# Patient Record
Sex: MALE | Race: WHITE | NOT HISPANIC OR LATINO | URBAN - METROPOLITAN AREA
[De-identification: names, ages, dates, MRNs, and addresses within clinical notes are randomized per-mention and may not be internally consistent; named-entity substitution may affect disease eponyms.]

---

## 2020-11-03 ENCOUNTER — PATIENT MESSAGE (OUTPATIENT)
Dept: PULMONOLOGY | Facility: CLINIC | Age: 45
End: 2020-11-03

## 2020-11-04 ENCOUNTER — TELEPHONE (OUTPATIENT)
Dept: PULMONOLOGY | Facility: CLINIC | Age: 45
End: 2020-11-04

## 2020-11-04 NOTE — TELEPHONE ENCOUNTER
----- Message from Rosa Marshall sent at 11/4/2020  9:38 AM CST -----  Contact: Jesus Lee is requesting a call back at [phone] in regards to a missed call he received. Jesus stated that he needs to schedule an appointment.     Thanks  DD

## 2020-11-17 ENCOUNTER — OFFICE VISIT (OUTPATIENT)
Dept: PULMONOLOGY | Facility: CLINIC | Age: 45
End: 2020-11-17

## 2020-11-17 VITALS
TEMPERATURE: 98 F | OXYGEN SATURATION: 96 % | BODY MASS INDEX: 28.47 KG/M2 | HEART RATE: 65 BPM | WEIGHT: 192.25 LBS | SYSTOLIC BLOOD PRESSURE: 124 MMHG | HEIGHT: 69 IN | DIASTOLIC BLOOD PRESSURE: 80 MMHG | RESPIRATION RATE: 17 BRPM

## 2020-11-17 DIAGNOSIS — R06.83 SNORING: ICD-10-CM

## 2020-11-17 DIAGNOSIS — R29.818 SUSPECTED SLEEP APNEA: Primary | ICD-10-CM

## 2020-11-17 DIAGNOSIS — G47.09 OTHER INSOMNIA: ICD-10-CM

## 2020-11-17 DIAGNOSIS — G47.19 EXCESSIVE DAYTIME SLEEPINESS: ICD-10-CM

## 2020-11-17 PROCEDURE — 99999 PR PBB SHADOW E&M-EST. PATIENT-LVL V: CPT | Mod: PBBFAC,,, | Performed by: INTERNAL MEDICINE

## 2020-11-17 PROCEDURE — 99215 OFFICE O/P EST HI 40 MIN: CPT | Mod: PBBFAC | Performed by: INTERNAL MEDICINE

## 2020-11-17 PROCEDURE — 99203 PR OFFICE/OUTPT VISIT, NEW, LEVL III, 30-44 MIN: ICD-10-PCS | Mod: S$PBB,,, | Performed by: INTERNAL MEDICINE

## 2020-11-17 PROCEDURE — 99999 PR PBB SHADOW E&M-EST. PATIENT-LVL V: ICD-10-PCS | Mod: PBBFAC,,, | Performed by: INTERNAL MEDICINE

## 2020-11-17 PROCEDURE — 99203 OFFICE O/P NEW LOW 30 MIN: CPT | Mod: S$PBB,,, | Performed by: INTERNAL MEDICINE

## 2020-11-17 NOTE — PATIENT INSTRUCTIONS
Improving sleep hygiene was discussed with the patient  Advise to Sleep as long as necessary to feel rested (usually seven to eight hours for adults) and then get out of bed  Maintain a regular sleep schedule, particularly a regular wake-up time in the morning  Try not to force sleep  Avoid caffeinated beverages after lunch, alcohol near bedtime , smoking or other nicotine intake, particularly during the evening  Adjust the bedroom environment as needed to decrease stimuli (reduce ambient light, turn off the television or radio)  Avoid prolonged use of light-emitting screens (laptops, tablets, smartphones, ebooks) before bedtime   Resolve concerns or worries before bedtime  Exercise regularly for at least 20 minutes, preferably more than four to five hours prior to bedtime   Avoid daytime naps, especially if they are longer than 20 to 30 minutes or occur late in the day  No eating / drinking for 3 hours before going to bed.  Elevate head of bed 30 - 45 %     CPAP HABITUATION PROCEDURE     Manuel Martinez, Ph.D., [facility] and Maxim Andrade M.D.  [facility], [facility]     Some people have difficulty adjusting to CPAP/BiPAP/AutoCPAP.  This is not unusual or hard to understand: Breathing with CPAP is different from ordinary breathing, and this difference is aversive to some. The problem can be overcome, however, and the benefits CPAP confers are certainly worth the effort.  Below, you will find a simple and gradual way to get used to CPAP before you try to use it all night, every night.  The essence of this procedure is to relax and let breathing with CPAP become a habit.  It may take about 2 weeks, and involves the followin. CPAP while awake and comfortably seated, during the late evening.     2. CPAP in bed while attempting sleep at night.     3. If your discomfort is too great at any time, discontinue and attempt again later the same night, for the same amount of time.    4. You and your physician may alter the times and pressures if necessary.     5. If you find that it is very easy to get used to CPAP, you may start using it every night when you are comfortable enough to do so.  6. IMPORTANT REMINDER: If you have a cold or sinus congestion it is okay to miss a night or two of CPAP. Consider using antihistamines or decongestants to clear up your sinus congestion prior to sleeping.     DAYS  1-3   Start CPAP while awake and comfortably seated during the late evening, after having prepared for bed.  You may do this while watching television, listening to music or reading. Use for 1 hour, then take off CPAP and go directly to bed to sleep     DAYS  4-6     Start CPAP when you go to bed and use for 1 hour, or until you fall asleep.  If your discomfort is too great at any time, discontinue and attempt again later the same night, for the same designated amount of time (1 hour).      DAYS  7-9     Increase time with CPAP to 2 hours a night.  If your discomfort is too great at any time, discontinue and attempt again later the same night, for the same designated amount of time (2 hours).      DAYS 10-12    Increase time with CPAP to 3 hours a night. If your discomfort is too great at any time, discontinue and attempt again later the same night, for the same designated amount of time (3 hours).      DAYS 13-15     Sleep the entire night with CPAP.      OPTIONAL: You may use Progressive Muscle Relaxation (PMR) to help put you at ease when using CPAP; do PMR twice each day, once in the morning or afternoon, and once in the evening just before using CPAP. You may do PMR prior to any attempt until you are comfortable with CPAP.        Continuous Positive Air Pressure (CPAP)  Continuous positive air pressure (CPAP) uses gentle air pressure to hold the airway open. CPAP is often the most effective treatment for sleep apnea and severe snoring. It works very well for many people. But keep in mind  that it can take several adjustments before the setup is right for you.       How CPAP Works  CPAP is a small portable pump beside the bed. The pump sends air through a hose, which is held over your nose and/or mouth by a mask. Mild air pressure is gently pushed through your airway. The air pressure nudges sagging tissues aside. This widens the airway so you can breathe better. CPAP may be combined with other kinds of therapy for sleep apnea.       Types of Air Pressure Treatments  There are different types of CPAP. Your doctor or CPAP technician will help you decide which type is best for you:  · Basic CPAP keeps the pressure constant all night long.  · A bilevel device (BiPAP) provides more pressure when you breathe in and less when you breathe out. A BiPAP machine also may be set to provide automatic breaths to maintain breathing if you stop breathing while sleeping.  · An autoCPAP device automatically adjusts pressure throughout the night and in response to changes such as body position, sleep stage, and snoring.  © 0700-4254 [employer]. [address] [place] [address]. All rights reserved. This information is not intended as a substitute for professional medical care. Always follow your healthcare professional's instructions.        Snoring and Sleep Apnea: Notes for a Partner  Snoring and sleep apnea affect your life, as well as your partners. You can help in the treatment of the problem. Be supportive. Encourage your partner both to get treatment and to make the adjustments needed to follow through.       Adjusting to Changes  Your partners treatment may involve making changes to certain life habits. You can help your partner make and stick with these changes. For example:  · Support and even join your partners exercise program.  · Be supportive if your partner gets CPAP (continuous positive airway pressure). He or she may feel self-conscious at first. Remind your partner to expect  adjustments to CPAP before it feels just right.  · Consider joining a snoring and sleep apnea support group.  Go Along to See the Health Care Provider  You can give the health care provider the best account of your partners nighttime breathing and snoring patterns. Try to go along to health care providers appointments. If you cant go, write notes for your partner to give to the health care provider. Describe your partners snoring and sleep breathing patterns in detail.  Tips for Sleeping with a Snorer  Until treatment takes care of your partners snoring:  · Try to go to bed first. It may help if youre already asleep when your partner starts to snore.  · Wear earplugs to bed. A fan or other source of background noise may also help drown out snoring.   © 6353-6858 [employer]. [address] [place] [address], [place] [address]. All rights reserved. This information is not intended as a substitute for professional medical care. Always follow your healthcare professional's instructions.        Continuous Positive Airway Pressure (CPAP)  Your health care provider has prescribed continuous positive airway pressure (CPAP) therapy for you. A CPAP device helps you breathe better at night. The device delivers air through your nose or mouth when you breathe in to keep your air passages open. CPAP is:  · Used most often to treat sleep apnea and some other problems (Sleep apnea is a chronic condition with periods of sleep in which you briefly stop breathing.)  · Safe and very effective, but it takes time to get used to the mask.   Your health care provider, nurse, or medical supplier will give you tips for wearing and caring for your CPAP device.  General guidelines  · It's very important not to give up! It takes time to get used to wearing the mask at night.  · Practice using your CPAP device during the day, especially whenever you take a nap.  · Remember, there are several different types of masks. If you cant get  used to your mask, ask your provider or medical supply company about trying another style.  · If you have nasal stuffiness or dryness when using your CPAP device, talk with your provider or medical supply company. There are ways to lessen these problems. For example, your provider may recommend moistening nasal spray or the medical supply company may recommend a device with a humidifier.  · The goal is to use your CPAP all night, every night, during all naps, and even when you travel.  · Keep your mask clean. Wash it with soap and water. Be sure to rinse the mask and tubing well with water to remove any soap. Let them air-dry thoroughly before using.  · Make yourself comfortable when sleeping with CPAP. Try using extra pillows.  Work with your medical supply company so that you know how to correctly use your CPAP. Their representative will be able to help you:  · Use the CPAP correctly  · Troubleshoot any problems that come up  · Learn to clean and maintain the device  · Adjust to regular use of the CPAP  © 2911-8729 [employer]. [address] [place] [address], [place] [address]. All rights reserved. This information is not intended as a substitute for professional medical care. Always follow your healthcare professional's instructions.

## 2020-11-17 NOTE — PROGRESS NOTES
Initial Outpatient Pulmonary Evaluation       SUBJECTIVE:     Chief Complaint   Patient presents with    Insomnia       History of Present Illness:    Patient is a 45 y.o. male presenting for evaluation of chronic insomnia suspected sleep apnea.    Complains of snoring, excessive daytime sleepiness [place] Sleepiness Scale score of 10, nonrestorative sleep, morning headaches.    Tried in the past Ativan for insomnia and currently using Ambien.    Goes to bed at 8:00 p.m. usually.  Sleep onset latency 1 hr.    Wakes up 2-3 a.m..  Most of the time cannot return back to sleep.    Does sleep during the day.    Works as a .    Never smoker.    Denies personal family history of asthma.        STOP - BANG Questionnaire:     1. Snoring : Do you snore loudly ?    Yes    2. Tired : Do you often feel tired, fatigued, or sleepy during daytime? Yes    3. Observed: Has anyone observed you stop breathing during your sleep?   Yes     4. Blood pressure : Do you have or are you being treated for high blood pressure?   Yes    5. BMI :BMI more than 35 kg/m2?   No    6. Age : Age over 50 yr old?   No    7. Neck circumference:   For male, is your shirt collar 17 inches / 43cm or larger?  For female, is your shirt collar 16 inches / 41cm or larger?    No    8. Gender: Gender male?   Yes    STOP BANG SCORE 5    High risk of VANI: Yes 5 - 8  Intermediate risk of VANI: Yes 3 - 4  Low risk of VANI: Yes 0 - 2      References:   STOP Questionnaire   A Tool to Screen Patients for Obstructive Sleep Apnea: EVAN Steven.C.P.C., MARY Almonte.B.B.S., Jose R Jacobs M.D.,Denise Calvillo, Ph.D., MARY Guillory.B.B.S.,_ Wilda Stephenson.,_ Yg Zuniga M.D., Dean Carter F.R.C.P.C.; Anesthesiology 2008; 108:812-21 Copyright © 2008, the American Society of Anesthesiologists, Inc. [employer].      Review of Systems   Constitutional: Positive for fatigue  and weakness. Negative for fever and chills.   HENT: Negative for nosebleeds.    Eyes: Negative for redness.   Respiratory: Positive for apnea, snoring and somnolence. Negative for choking.    Cardiovascular: Negative for chest pain.   Genitourinary: Negative for hematuria.   Endocrine: Negative for cold intolerance.    Musculoskeletal: Negative for arthralgias.   Skin: Negative for rash.   Gastrointestinal: Positive for acid reflux. Negative for vomiting.   Neurological: Positive for headaches. Negative for syncope.   Hematological: Negative for adenopathy.   Psychiatric/Behavioral: Positive for sleep disturbance. Negative for confusion. The patient is nervous/anxious.        Review of patient's allergies indicates:  No Known Allergies    Current Outpatient Medications   Medication Sig Dispense Refill    amLODIPine (NORVASC) 5 MG tablet Take 1 tablet (5 mg total) by mouth once daily. 90 tablet 1    hydroCHLOROthiazide (HYDRODIURIL) 25 MG tablet Take 1 tablet (25 mg total) by mouth once daily. 90 tablet 1    LORazepam (ATIVAN) 1 MG tablet Take 1 tablet (1 mg total) by mouth every evening. 30 tablet 5    losartan (COZAAR) 100 MG tablet Take 1 tablet (100 mg total) by mouth once daily. 90 tablet 1    omeprazole (PRILOSEC) 40 MG capsule Take 1 capsule (40 mg total) by mouth once daily. 90 capsule 1    zolpidem (AMBIEN) 10 mg Tab Take 1 tablet (10 mg total) by mouth nightly as needed. 30 tablet 5    predniSONE (DELTASONE) 20 MG tablet 2 po qd x3 and 1 po qd x4 (Patient not taking: Reported on 11/17/2020) 10 tablet 0    sumatriptan (IMITREX) 100 MG tablet Take 1 tablet (100 mg total) by mouth every 2 (two) hours as needed for Migraine. (Patient not taking: Reported on 11/17/2020) 9 tablet 0     No current facility-administered medications for this visit.        Past Medical History:   Diagnosis Date    GERD (gastroesophageal reflux disease)     Hypertension     Insomnia     Migraines      Past Surgical  "History:   Procedure Laterality Date    fracutre repair      HEMORRHOIDECTOMY INVOLVING TWO OR MORE ANAL COLUMNS      HERNIA REPAIR       History reviewed. No pertinent family history.  Social History     Tobacco Use    Smoking status: Never Smoker    Smokeless tobacco: Never Used   Substance Use Topics    Alcohol use: Not on file    Drug use: Not on file          OBJECTIVE:     Vital Signs (Most Recent)  Vital Signs  Temp: 98.1 °F (36.7 °C)  Pulse: 65  Resp: 17  SpO2: 96 %  BP: 124/80  Height and Weight  Height: 5' 9" (175.3 cm)  Weight: 87.2 kg (192 lb 3.9 oz)  BSA (Calculated - sq m): 2.06 sq meters  BMI (Calculated): 28.4  Weight in (lb) to have BMI = 25: 168.9]  Wt Readings from Last 2 Encounters:   11/17/20 87.2 kg (192 lb 3.9 oz)   10/20/20 87.5 kg (192 lb 12.8 oz)         Physical Exam:  Physical Exam   Constitutional: He is oriented to person, place, and time. He appears well-developed and well-nourished.   HENT:   Head: Normocephalic.   Neck: Neck supple.   Cardiovascular: Normal rate, regular rhythm and normal heart sounds.   Pulmonary/Chest: Normal expansion and effort normal. No stridor. No respiratory distress. He exhibits no tenderness.   Abdominal: Soft.   Musculoskeletal:         General: No tenderness.   Lymphadenopathy:     He has no cervical adenopathy.   Neurological: He is alert and oriented to person, place, and time. Gait normal.   Skin: Skin is warm. No cyanosis. Nails show no clubbing.   Psychiatric: He has a normal mood and affect. His behavior is normal. Judgment and thought content normal.   Nursing note and vitals reviewed.      Laboratory  No results found for: WBC, RBC, HGB, HCT, MCV, MCH, MCHC, RDW, PLT, MPV, GRAN, LYMPH, MONO, EOS, BASO, EOSINOPHIL, BASOPHIL    BMP  No results found for: NA, K, CL, CO2, BUN, CREATININE, CALCIUM, ANIONGAP, ESTGFRAFRICA, EGFRNONAA, AST, ALT, PROT    No results found for: BNP    No results found for: TSH    No results found for: SEDRATE    No " results found for: CRP    No results found for: IGE    No results found for: ASPERGILLUS  No results found for: AFUMIGATUSCL     No results found for: ACE        ASSESSMENT/PLAN:     Suspected sleep apnea  -     Home Sleep Studies; Future    Snoring  -     Home Sleep Studies; Future    Excessive daytime sleepiness  -     Home Sleep Studies; Future    Other insomnia  -     Ambulatory referral/consult to Sleep Disorders        Improving sleep hygiene was discussed with the patient  Advise to Sleep as long as necessary to feel rested (usually seven to eight hours for adults) and then get out of bed  Maintain a regular sleep schedule, particularly a regular wake-up time in the morning  Try not to force sleep  Avoid caffeinated beverages after lunch, alcohol near bedtime , smoking or other nicotine intake, particularly during the evening  Adjust the bedroom environment as needed to decrease stimuli (reduce ambient light, turn off the television or radio)  Avoid prolonged use of light-emitting screens (laptops, tablets, smartphones, ebooks) before bedtime   Resolve concerns or worries before bedtime  Exercise regularly for at least 20 minutes, preferably more than four to five hours prior to bedtime   Avoid daytime naps, especially if they are longer than 20 to 30 minutes or occur late in the day    Provided with sleep log sheet.      Recommendation regarding CPAP to follow sleep study results.    Advised patient to obtain yearly influenza vaccination.    Follow up in about 2 months (around 1/17/2021).    This note was prepared using voice recognition system and is likely to have sound alike errors that may have been overlooked even after proof reading.  Please call me with any questions    Discussed diagnosis, its evaluation, treatment and usual course. All questions answered.    Thank you for the courtesy of participating in the care of this patient    Santiago De Leon MD

## 2020-11-17 NOTE — LETTER
November 17, 2020      Phillip Syed MD  [address] [facility] [place] [address]           [facility]  [address] [facility] [address]  Phone: [phone]  Fax: [phone]          Patient: Jesus Pan   MR Number: [id number]   YOB: 1975   Date of Visit: 11/17/2020       Dear Dr. Phillip Syed:    Thank you for referring Jesus Pan to me for evaluation. Attached you will find relevant portions of my assessment and plan of care.    If you have questions, please do not hesitate to call me. I look forward to following Jesus Pan along with you.    Sincerely,    Santiago De Leon MD    Enclosure  CC:  No Recipients    If you would like to receive this communication electronically, please contact [email or web address] or [phone] to request more information on [employer] access.    For providers and/or their staff who would like to refer a patient to [facility], please contact us through our one-stop-shop provider referral line, [facility], at [phone].    If you feel you have received this communication in error or would no longer like to receive these types of communications, please e-mail [email or web address]

## 2021-01-14 DIAGNOSIS — U07.1 COVID-19 VIRUS DETECTED: ICD-10-CM

## 2023-07-07 ENCOUNTER — PATIENT MESSAGE (OUTPATIENT)
Dept: INFECTIOUS DISEASES | Facility: CLINIC | Age: 48
End: 2023-07-07